# Patient Record
Sex: MALE | Race: WHITE | NOT HISPANIC OR LATINO | Employment: UNEMPLOYED | URBAN - METROPOLITAN AREA
[De-identification: names, ages, dates, MRNs, and addresses within clinical notes are randomized per-mention and may not be internally consistent; named-entity substitution may affect disease eponyms.]

---

## 2023-06-26 ENCOUNTER — TRANSFERRED RECORDS (OUTPATIENT)
Dept: HEALTH INFORMATION MANAGEMENT | Facility: CLINIC | Age: 68
End: 2023-06-26

## 2023-07-12 ENCOUNTER — TRANSFERRED RECORDS (OUTPATIENT)
Dept: HEALTH INFORMATION MANAGEMENT | Facility: CLINIC | Age: 68
End: 2023-07-12
Payer: COMMERCIAL

## 2023-07-12 ENCOUNTER — MEDICAL CORRESPONDENCE (OUTPATIENT)
Dept: HEALTH INFORMATION MANAGEMENT | Facility: CLINIC | Age: 68
End: 2023-07-12
Payer: COMMERCIAL

## 2023-07-12 NOTE — TELEPHONE ENCOUNTER
Records Requested     July 12, 2023 11:40 AM   [id number]   Facility  [facility]   Outcome 11:50am Sent request for imaging & records to be faxed and/or pushed to PACS. -KARENA Nugent on 7/14/2023 at 2:53 PM Records sent to HIM, XR resolve into PACS. -KARENA     Records Requested     July 14, 2023 8:15 AM   [id number]   Facility  [facility]   Outcome 8:18am Sent request for imaging to be pushed to PACS. -KARENA Nugent on 7/17/2023 at 8:12 AM Called [facility] to follow up on imaging request. No answer, left VM that image is needed by this afternoon. -KARENA     RECORDS RECEIVED FROM: Care Everywhere   REASON FOR VISIT: Low back pain    Date of Appt: 7/17/23 2:30pm    NOTES (FOR ALL VISITS) STATUS DETAILS   OFFICE NOTE from referring provider In process 7/12/23 (Transferred Recs)  Herb Nixon MD @ [facility]     DISCHARGE REPORT from the ER In process 6/30/23 Damáin Retana MD @ [facility]     MEDICATION LIST N/A    IMAGING  (FOR ALL VISITS)     X-RAY PACS [facility]  7/12/23  XR Lumbar Spine     MRI (HEAD, NECK, SPINE) In process [facility]  6/26/23  MR Lumbar Spine

## 2023-07-13 ENCOUNTER — TRANSCRIBE ORDERS (OUTPATIENT)
Dept: OTHER | Age: 68
End: 2023-07-13

## 2023-07-13 DIAGNOSIS — G89.29 CHRONIC BILATERAL LOW BACK PAIN WITHOUT SCIATICA: Primary | ICD-10-CM

## 2023-07-13 DIAGNOSIS — M54.50 CHRONIC BILATERAL LOW BACK PAIN WITHOUT SCIATICA: Primary | ICD-10-CM

## 2023-07-14 DIAGNOSIS — M54.50 LOW BACK PAIN, UNSPECIFIED BACK PAIN LATERALITY, UNSPECIFIED CHRONICITY, UNSPECIFIED WHETHER SCIATICA PRESENT: Primary | ICD-10-CM

## 2023-07-15 ENCOUNTER — TELEPHONE (OUTPATIENT)
Dept: NEUROSURGERY | Facility: CLINIC | Age: 68
End: 2023-07-15
Payer: COMMERCIAL

## 2023-07-17 ENCOUNTER — PRE VISIT (OUTPATIENT)
Dept: NEUROSURGERY | Facility: CLINIC | Age: 68
End: 2023-07-17

## 2023-07-17 ENCOUNTER — OFFICE VISIT (OUTPATIENT)
Dept: NEUROSURGERY | Facility: CLINIC | Age: 68
End: 2023-07-17
Payer: COMMERCIAL

## 2023-07-17 ENCOUNTER — ANCILLARY PROCEDURE (OUTPATIENT)
Dept: GENERAL RADIOLOGY | Facility: CLINIC | Age: 68
End: 2023-07-17
Attending: PHYSICIAN ASSISTANT
Payer: COMMERCIAL

## 2023-07-17 VITALS
SYSTOLIC BLOOD PRESSURE: 110 MMHG | OXYGEN SATURATION: 92 % | HEART RATE: 72 BPM | RESPIRATION RATE: 16 BRPM | WEIGHT: 315 LBS | DIASTOLIC BLOOD PRESSURE: 72 MMHG

## 2023-07-17 DIAGNOSIS — M54.16 LUMBAR RADICULOPATHY: ICD-10-CM

## 2023-07-17 DIAGNOSIS — M54.50 LOW BACK PAIN, UNSPECIFIED BACK PAIN LATERALITY, UNSPECIFIED CHRONICITY, UNSPECIFIED WHETHER SCIATICA PRESENT: ICD-10-CM

## 2023-07-17 DIAGNOSIS — M54.50 CHRONIC BILATERAL LOW BACK PAIN WITHOUT SCIATICA: ICD-10-CM

## 2023-07-17 DIAGNOSIS — G89.29 CHRONIC BILATERAL LOW BACK PAIN WITHOUT SCIATICA: ICD-10-CM

## 2023-07-17 DIAGNOSIS — R29.898 LEG WEAKNESS, BILATERAL: ICD-10-CM

## 2023-07-17 DIAGNOSIS — M48.04 THORACIC STENOSIS: Primary | ICD-10-CM

## 2023-07-17 PROCEDURE — 77073 BONE LENGTH STUDIES: CPT | Performed by: STUDENT IN AN ORGANIZED HEALTH CARE EDUCATION/TRAINING PROGRAM

## 2023-07-17 PROCEDURE — 99205 OFFICE O/P NEW HI 60 MIN: CPT | Performed by: PHYSICIAN ASSISTANT

## 2023-07-17 PROCEDURE — 72082 X-RAY EXAM ENTIRE SPI 2/3 VW: CPT | Performed by: STUDENT IN AN ORGANIZED HEALTH CARE EDUCATION/TRAINING PROGRAM

## 2023-07-17 PROCEDURE — 99417 PROLNG OP E/M EACH 15 MIN: CPT | Performed by: PHYSICIAN ASSISTANT

## 2023-07-17 RX ORDER — GLIPIZIDE 10 MG/1
10 TABLET ORAL
COMMUNITY
Start: 2023-05-03

## 2023-07-17 RX ORDER — DAPAGLIFLOZIN 10 MG/1
1 TABLET, FILM COATED ORAL
COMMUNITY
Start: 2022-08-05

## 2023-07-17 RX ORDER — PREGABALIN 75 MG/1
75 CAPSULE ORAL
COMMUNITY
Start: 2023-05-05

## 2023-07-17 RX ORDER — TRAMADOL HYDROCHLORIDE 50 MG/1
50 TABLET ORAL EVERY 8 HOURS PRN
COMMUNITY
Start: 2023-06-30

## 2023-07-17 RX ORDER — TORSEMIDE 20 MG/1
20 TABLET ORAL
COMMUNITY
Start: 2022-05-20

## 2023-07-17 RX ORDER — HYDROCODONE BITARTRATE AND ACETAMINOPHEN 5; 325 MG/1; MG/1
TABLET ORAL
COMMUNITY
Start: 2023-04-27

## 2023-07-17 RX ORDER — DIGOXIN 250 MCG
1 TABLET ORAL DAILY
COMMUNITY
Start: 2022-09-23

## 2023-07-17 RX ORDER — LISINOPRIL 20 MG/1
20 TABLET ORAL
COMMUNITY
Start: 2022-08-31

## 2023-07-17 RX ORDER — COLCHICINE 0.6 MG/1
0.6 TABLET ORAL
COMMUNITY
Start: 2023-04-03

## 2023-07-17 RX ORDER — OXYCODONE AND ACETAMINOPHEN 5; 325 MG/1; MG/1
1-2 TABLET ORAL
COMMUNITY
Start: 2023-06-30

## 2023-07-17 RX ORDER — CELECOXIB 100 MG/1
100 CAPSULE ORAL 2 TIMES DAILY PRN
COMMUNITY
Start: 2023-06-08

## 2023-07-17 RX ORDER — MAGNESIUM OXIDE 400 MG/1
400 TABLET ORAL
COMMUNITY
Start: 2023-04-17

## 2023-07-17 RX ORDER — PREDNISOLONE ACETATE 10 MG/ML
SUSPENSION/ DROPS OPHTHALMIC
COMMUNITY
Start: 2022-12-21

## 2023-07-17 RX ORDER — NEOMYCIN SULFATE, POLYMYXIN B SULFATE, AND DEXAMETHASONE 3.5; 10000; 1 MG/G; [USP'U]/G; MG/G
OINTMENT OPHTHALMIC
COMMUNITY
Start: 2023-05-16

## 2023-07-17 RX ORDER — AZITHROMYCIN 250 MG/1
TABLET, FILM COATED ORAL
COMMUNITY
Start: 2023-07-13

## 2023-07-17 RX ORDER — DOXYCYCLINE 100 MG/1
100 CAPSULE ORAL
COMMUNITY
Start: 2023-06-01

## 2023-07-17 RX ORDER — METOPROLOL TARTRATE 100 MG
100 TABLET ORAL
COMMUNITY
Start: 2022-05-23

## 2023-07-17 ASSESSMENT — PAIN SCALES - GENERAL: PAINLEVEL: WORST PAIN (10)

## 2023-07-17 NOTE — PATIENT INSTRUCTIONS
Recommend weight loss to about 281 lbs which will be < 40 BMI so you can be a surgical candidate if needed.  Consider tirzepatide (Mounjaro) with your PCP or we can set up a consult with our weight management team here.    Thoracic MRI ordered.  This can be done at [facility] in the bariatric machine.  Follow up with me 2-3 days after the injection.  Okay to do this virtually.      Okay to proceed with scheduled L2/3 JUNO on 7/20.  If this is not helpful, we can consider a referral to PM&R to discuss other injections or treatments for your back and radicular pain.     PT order given to patient to go to [place].  You will need to contact PT to get started.

## 2023-07-17 NOTE — PROGRESS NOTES
Neurosurgery Clinic Note    Chief Complaint: low back pain, RLE radiculopathy    History of Present Illness:  It was a pleasure to evaluate Casey Mendez in clinic today at the kind referral of   Damián Retana MD  [facility]  [address] [place] [address].    Casey Mendez is a 67 year old male with a PMH of a-fib (warfarin), DM2 (trulicity, metformin, glargine), BMI 49 who is presenting for evaluation of low back pain and RLE radiculopathy.    Patient describes a sharp shooting pain in lower back that extends down the back of right thigh, calf and bottom of foot for about 3 months.  His pain comes and goes.  He sits on ice packs, uses vibrator for calves and back.  He has great difficulty with walking as he is weak in his legs and feels his toes catch on the floor.  He is able to hobble around short distances, but uses a wheelchair for longer distances.  He was on narcotics and was having issues with constipation, he denies incontinence.  He has bilateral neuropathy that goes up to his knees.  He also notes numbness in his hands.  He has had 2 surgeries in last 2 months, CTS on right and lipoma right shoulder removed. He had spinal surgery when he was 23 y/o for lumbar decompression of nerve on left side through [facility].  He has not had any recent PT, but is open to doing some.  He had a right L5/S1 TFESI through [facility] on 7/6/23 that relieved the radicular pain in his right leg for 1.5 days before it all came back.  This did not help his back pain.  He is schedule to try a L2/3 JUNO on 7/20/23.      He notes weight loss from 464 to 351 lbs.  He continues to work on this with his PCP.      He has difficulty with recalling what medication he has tried in the past and what he is currently taking.  He has tried gabapentin in the past, but said he had to quit taking that because of another medication he was placed on.  I see that he is currently on Lyrica, but he is not familiar with that  medication so I am unsure if he is taking this.         No past medical history on file.    No past surgical history on file.    Social History     Socioeconomic History     Marital status: Single       family history is not on file.       IMAGING   Independently reviewed.       EOS XR 7/17/23 - Trace L4/5 anterolisthesis, DDD L5/S1 most significant. Leg length discrepancy of about 3 cm with right leg being shorter than left leg and about a 12 degree left pelvic obliquity.  Mild dextro thoracolumbar curvature.          Findings:  12 rib bearing vertebral bodies and 5 lumbar type vertebral bodies are  identified.  Coronal Deformity:  There is a mild  convexed right curvature of thoracolumbar/lumbar  spine with apex at L3.   No substantial global coronal imbalance.  Sagittal Vertical Axis (A vertical line drawn from the center of C7  (se line) to the posterosuperior aspect of the S1 on sagittal  plane):  very positive   Weight bearing axis: (Defined as a line drawn from the center of the  femoral head to the mid aspect of the tibial plafond).      Right: Weight bearing axis crosses middle 1/3 of medial tibial  plateau.       Left: Weight bearing axis crosses peripheral 1/3 of medial tibial  plateau.  Leg length:  (Measured from the top of the femoral head to the center  of tibial plafond.  It is assumed joints are in similar degrees of  extension bilaterally.  Significant difference is defined when  discrepancy is greater than 1.5 cm).  Leg length discrepancy measurements are limited given increased right  knee flexion relative to the left. Measuring 87.5 cm on the left and  84.9 cm on the right.  Additional Findings:  Postoperative changes of bilateral total knee arthroplasty. Hardware  appears intact without evidence for loosening.  Borderline cardiomegaly. Soft tissue calcifications about the left  greater than right lateral calf.  No acute osseous abnormality.  There is a nonobstructive bowel gas  pattern.                                                                  Impression:  1. Mild rightward convex curvature of the thoracolumbar/lumbar spine.   2. Very positive global sagittal imbalance.  3. Weight bearing axis as detailed above.  4. Postoperative changes of bilateral total knee arthroplasty without  hardware complication.      MRI lumbar [facility] 23 - CCS from T10-T12 (not viewable on axial imaging), L2/3.  L4/5 joint arthropathy, facet joint effusion and bilateral FS.  Encroachment of NR at every level from L2-S1, specifically on the right at L5/S1 affecting L5 and S1 NR.                    CONCLUSION: Multilevel degenerative disc disease and Schmorl's nodes. Developmental narrowing of the central spinal canal with specific findings according to level includin. Mild cord compression and moderate central spinal canal stenosis at T10-11. Ventral cord flattening and mild central spinal canal stenosis at T11-12. Axial images were not obtained through these levels.  2. Moderate dural sac narrowing at L4-5 with impingement upon the traversing L5 nerves.  3. Moderate dural sac narrowing at L3-4 and L2-3 with encroachment upon the traversing nerves at each of these levels.  4. Subarticular recess stenosis at L5-S1 with encroachment upon the traversing S1 nerves. Moderate right foraminal stenosis with encroachment upon the right L5 nerve.    IR FLUOROGUIDED TRANSFORAMINAL INJ  Result Date: 2023  PROCEDURE: FLUOROSCOPIC GUIDED TRANSFORAMINAL EPIDURAL INJECTION. HISTORY: Low back pain. Right-sided lumbar radiculopathy. L5-S1 injection requested. COMPARISON: MRI from 2023. TECHNIQUE: Risks, benefits and alternatives to the procedure were discussed with the patient and informed consent was obtained. The patient placed in the prone position. The lower back was prepped and draped using sterile technique. A timeout was performed, confirming the patient's name, date of birth, and the  type and the location of the  "procedure. 1% lidocaine was used for local anesthesia. Using intermittent fluoroscopy, a 5 inch 22-gauge spinal needle was advanced into the neural foraminal space at the L5-S1 level on the right utilizing a transforaminal approach. Needle tip position was confirmed after the injection of 1 to 2 mL of Omnipaque 300, which confirmed free flow of contrast into the perineural and epidural space. Subsequently, a combination of 2 mL of dexamethasone (10 mg/mL) and 2 mL of 1% preservative-free Xylocaine were injected. The needle was removed and hemostasis was achieved at the skin puncture site. The patient tolerated the procedure well with no immediate complications or complaints. A total of 48 seconds of fluoroscopy time was utilized for the procedure, and 2 spot images were saved. The patient's preprocedural pain level was 10 out of 10. The patient's postprocedure pain level was 5 out of 10. IMPRESSION: Successful fluoroscopically guided lumbar spine epidural steroid/anesthetic injection performed on the right at the L5-S1 level using a transforaminal approach. Electronically signed by Kaleb Ordoñez MD Report Date: 7/6/2023 10:46 AM    US SOFT TISSUE MISC  Result Date: 4/12/2023  PROCEDURE: US SOFT TISSUE MISC HISTORY: Other specified joint disorders, unspecified shoulder; Please perform US of right shoulder cyst; For:  Cyst of joint of shoulder COMPARISON: None FINDINGS: The palpable abnormality along the posterior aspect of the right shoulder corresponds with a homogeneous solid mass measuring 6.0 x 5.6 x 2.3 cm. This has imaging characteristics most compatible with a lipoma. IMPRESSION: Apparent lipoma corresponding with the palpable lump along the dorsal aspect of the right shoulder. Electronically signed by Emory Pablo MD  Report Date: 4/12/2023 1:22 PM      Nutritional Status:  Height reported by patient as 5'11\"  Weight today 351 pounds.   BMI calculated 49     Physical Exam   /72   Pulse 72   Resp 16   " Wt (!) 159.2 kg (351 lb)   SpO2 92%     Constitutional: Appears well-developed, morbidly obese. Cooperative. No acute distress.   HENT:   Head: Normocephalic and atraumatic.   Eyes: Conjunctivae are normal.  Neck: Neck supple. No tracheal deviation present.  Cardiovascular: Normal rate and regular rhythm.    Pulmonary/Chest: Effort normal and breath sounds normal.  Abdominal: Exhibits no obvious distension.   Musculoskeletal: Able to move all extremities.  No involuntary motor movements. No C/T/L spine tenderness to palpation.    Lumbar flexion/extension range of motion: difficulty with standing upright and limited on ability to F/E--pain noted more with extension vs. flexion.    Skin: Skin is warm, dry and intact.   Psychiatric: Normal mood and affect. Speech is normal and behavior is normal.    Neurological:  Alert, NAD, and oriented to person, place, and time.   No cranial nerve deficit   Gait: wide based, unsteady, hobbling    Strength (L/R)  5/5 Deltoid  5/5 Bicep  5/5 Tricep  5/5 Handgrip    4/4 Hip Flexion  5/5 Knee Extension  4+/4 Ankle Dorsiflexion  3/4 Extensor Hallucis Longus  5/5 Plantar Flexion    Reflexes (L/R)  1+/1+ Bicep  1+/1+ Brachioradialis  0/0 Patellar   0/0 Ankle    No Lhermitte's  No Spurling's  No Jaret's   No ankle clonus    Sensation: notes BLE sensory deficits to about the knee level     ASSESSMENT:  Casey Mendez is a 67 year old male with a PMH of a-fib (warfarin), DM2 (trulicity, metformin, glargine), BMI 49 who is presenting for evaluation of low back pain and RLE radiculopathy.    He has right S1 radicular pain that was relieved for 1.5 days with the right L5/S1 TFESI on 7/6/23.  This did not help his back pain.  His back pain is multifactorial which is likely a combination from his back, excess weight and deconditioning.  It is not likely the L2/3 injection will alleviate his back pain, but it may improve it some for him to participate better with PT.  He has weakness in his  hip flexors and ability to dorsiflex he right greater than left foot and toes.  It is unclear to me at this time if it is a result of the degenerative changes in his back or if he has compression of the spinal cord in the thoracic region as seen on the upper aspect of the lumbar MRI.  Unfortunately the extend of compression is not able to be viewed on axial imaging and he will need to have a thoracic MRI to view this in any meaningful manner.  He is hyporeflexic in his BLE, which I would expect with him having peripheral neuropathy up to his knees.     He is not currently a surgical candidate because of his BMI so will need to continue with conservative treatments at this time.  Surgery would not fix his back pain because this is multifactorial, but would likely alleviate the radicular component of his pain.      PLAN:    The patient's elevated BMI confers significantly elevated surgical and medical risk and the patient has been counseled about weight loss which he would like to do through his PCP if possible.  He will need to have a BMI less than 40, which is a goal of 281 lbs.  I would recommend tirzepatide (Mounjaro) if his insurance will cover it.      PT order provided to patient  - he would like to do this out in [place].  Work on increasing mobility and strengthening of torso and legs.     Thoracic MRI w/o contrast - needs bariatric MRI so can be done a [facility]. Follow up with me 2-3 days after the MRI has been completed to review.  Virtual visit okay.      He has injection scheduled at [place] on 7/20 for L2/3 JUNO.      I spent 116 minutes spent in patient care, independent review and interpretation of medical records/imaging, reviewing old records.       Cotrney Alcantar PA-C  Department of Neurosurgery  Office: [phone]

## 2023-07-17 NOTE — LETTER
7/17/2023       RE: Casey Mendez  [address] [facility] [address]     Dear Colleague,    Thank you for referring your patient, Casey Mendez, to the [facility] [place] at [facility]. Please see a copy of my visit note below.        Neurosurgery Clinic Note    Chief Complaint: low back pain, RLE radiculopathy    History of Present Illness:  It was a pleasure to evaluate Casey Mendez in clinic today at the kind referral of   Damián Retana MD  [facility]  [address] [place] [address].    Csaey Mendez is a 67 year old male with a PMH of a-fib (warfarin), DM2 (trulicity, metformin, glargine), BMI 49 who is presenting for evaluation of low back pain and RLE radiculopathy.    Patient describes a sharp shooting pain in lower back that extends down the back of right thigh, calf and bottom of foot for about 3 months.  His pain comes and goes.  He sits on ice packs, uses vibrator for calves and back.  He has great difficulty with walking as he is weak in his legs and feels his toes catch on the floor.  He is able to hobble around short distances, but uses a wheelchair for longer distances.  He was on narcotics and was having issues with constipation, he denies incontinence.  He has bilateral neuropathy that goes up to his knees.  He also notes numbness in his hands.  He has had 2 surgeries in last 2 months, CTS on right and lipoma right shoulder removed. He had spinal surgery when he was 23 y/o for lumbar decompression of nerve on left side through [facility].  He has not had any recent PT, but is open to doing some.  He had a right L5/S1 TFESI through [facility] on 7/6/23 that relieved the radicular pain in his right leg for 1.5 days before it all came back.  This did not help his back pain.  He is schedule to try a L2/3 JUNO on 7/20/23.      He notes weight loss from 464 to 351 lbs.  He continues to work on this with his  PCP.      He has difficulty with recalling what medication he has tried in the past and what he is currently taking.  He has tried gabapentin in the past, but said he had to quit taking that because of another medication he was placed on.  I see that he is currently on Lyrica, but he is not familiar with that medication so I am unsure if he is taking this.         No past medical history on file.    No past surgical history on file.    Social History     Socioeconomic History    Marital status: Single       family history is not on file.       IMAGING   Independently reviewed.       EOS XR 7/17/23 - Trace L4/5 anterolisthesis, DDD L5/S1 most significant. Leg length discrepancy of about 3 cm with right leg being shorter than left leg and about a 12 degree left pelvic obliquity.  Mild dextro thoracolumbar curvature.          Findings:  12 rib bearing vertebral bodies and 5 lumbar type vertebral bodies are  identified.  Coronal Deformity:  There is a mild  convexed right curvature of thoracolumbar/lumbar  spine with apex at L3.   No substantial global coronal imbalance.  Sagittal Vertical Axis (A vertical line drawn from the center of C7  (se line) to the posterosuperior aspect of the S1 on sagittal  plane):  very positive   Weight bearing axis: (Defined as a line drawn from the center of the  femoral head to the mid aspect of the tibial plafond).      Right: Weight bearing axis crosses middle 1/3 of medial tibial  plateau.       Left: Weight bearing axis crosses peripheral 1/3 of medial tibial  plateau.  Leg length:  (Measured from the top of the femoral head to the center  of tibial plafond.  It is assumed joints are in similar degrees of  extension bilaterally.  Significant difference is defined when  discrepancy is greater than 1.5 cm).  Leg length discrepancy measurements are limited given increased right  knee flexion relative to the left. Measuring 87.5 cm on the left and  84.9 cm on the right.  Additional  Findings:  Postoperative changes of bilateral total knee arthroplasty. Hardware  appears intact without evidence for loosening.  Borderline cardiomegaly. Soft tissue calcifications about the left  greater than right lateral calf.  No acute osseous abnormality.  There is a nonobstructive bowel gas  pattern.                                                                 Impression:  1. Mild rightward convex curvature of the thoracolumbar/lumbar spine.   2. Very positive global sagittal imbalance.  3. Weight bearing axis as detailed above.  4. Postoperative changes of bilateral total knee arthroplasty without  hardware complication.      MRI lumbar Rayus 23 - CCS from T10-T12 (not viewable on axial imaging), L2/3.  L4/5 joint arthropathy, facet joint effusion and bilateral FS.  Encroachment of NR at every level from L2-S1, specifically on the right at L5/S1 affecting L5 and S1 NR.                    CONCLUSION: Multilevel degenerative disc disease and Schmorl's nodes. Developmental narrowing of the central spinal canal with specific findings according to level includin. Mild cord compression and moderate central spinal canal stenosis at T10-11. Ventral cord flattening and mild central spinal canal stenosis at T11-12. Axial images were not obtained through these levels.  2. Moderate dural sac narrowing at L4-5 with impingement upon the traversing L5 nerves.  3. Moderate dural sac narrowing at L3-4 and L2-3 with encroachment upon the traversing nerves at each of these levels.  4. Subarticular recess stenosis at L5-S1 with encroachment upon the traversing S1 nerves. Moderate right foraminal stenosis with encroachment upon the right L5 nerve.    IR FLUOROGUIDED TRANSFORAMINAL INJ  Result Date: 2023  PROCEDURE: FLUOROSCOPIC GUIDED TRANSFORAMINAL EPIDURAL INJECTION. HISTORY: Low back pain. Right-sided lumbar radiculopathy. L5-S1 injection requested. COMPARISON: MRI from 2023. TECHNIQUE: Risks, benefits  and alternatives to the procedure were discussed with the patient and informed consent was obtained. The patient placed in the prone position. The lower back was prepped and draped using sterile technique. A timeout was performed, confirming the patient's name, date of birth, and the  type and the location of the procedure. 1% lidocaine was used for local anesthesia. Using intermittent fluoroscopy, a 5 inch 22-gauge spinal needle was advanced into the neural foraminal space at the L5-S1 level on the right utilizing a transforaminal approach. Needle tip position was confirmed after the injection of 1 to 2 mL of Omnipaque 300, which confirmed free flow of contrast into the perineural and epidural space. Subsequently, a combination of 2 mL of dexamethasone (10 mg/mL) and 2 mL of 1% preservative-free Xylocaine were injected. The needle was removed and hemostasis was achieved at the skin puncture site. The patient tolerated the procedure well with no immediate complications or complaints. A total of 48 seconds of fluoroscopy time was utilized for the procedure, and 2 spot images were saved. The patient's preprocedural pain level was 10 out of 10. The patient's postprocedure pain level was 5 out of 10. IMPRESSION: Successful fluoroscopically guided lumbar spine epidural steroid/anesthetic injection performed on the right at the L5-S1 level using a transforaminal approach. Electronically signed by Kaleb Ordoñez MD Report Date: 7/6/2023 10:46 AM    US SOFT TISSUE MISC  Result Date: 4/12/2023  PROCEDURE: US SOFT TISSUE MISC HISTORY: Other specified joint disorders, unspecified shoulder; Please perform US of right shoulder cyst; For:  Cyst of joint of shoulder COMPARISON: None FINDINGS: The palpable abnormality along the posterior aspect of the right shoulder corresponds with a homogeneous solid mass measuring 6.0 x 5.6 x 2.3 cm. This has imaging characteristics most compatible with a lipoma. IMPRESSION: Apparent lipoma  "corresponding with the palpable lump along the dorsal aspect of the right shoulder. Electronically signed by Emory Pablo MD  Report Date: 4/12/2023 1:22 PM      Nutritional Status:  Height reported by patient as 5'11\"  Weight today 351 pounds.   BMI calculated 49     Physical Exam   /72   Pulse 72   Resp 16   Wt (!) 159.2 kg (351 lb)   SpO2 92%     Constitutional: Appears well-developed, morbidly obese. Cooperative. No acute distress.   HENT:   Head: Normocephalic and atraumatic.   Eyes: Conjunctivae are normal.  Neck: Neck supple. No tracheal deviation present.  Cardiovascular: Normal rate and regular rhythm.    Pulmonary/Chest: Effort normal and breath sounds normal.  Abdominal: Exhibits no obvious distension.   Musculoskeletal: Able to move all extremities.  No involuntary motor movements. No C/T/L spine tenderness to palpation.    Lumbar flexion/extension range of motion: difficulty with standing upright and limited on ability to F/E--pain noted more with extension vs. flexion.    Skin: Skin is warm, dry and intact.   Psychiatric: Normal mood and affect. Speech is normal and behavior is normal.    Neurological:  Alert, NAD, and oriented to person, place, and time.   No cranial nerve deficit   Gait: wide based, unsteady, hobbling    Strength (L/R)  5/5 Deltoid  5/5 Bicep  5/5 Tricep  5/5 Handgrip    4/4 Hip Flexion  5/5 Knee Extension  4+/4 Ankle Dorsiflexion  3/4 Extensor Hallucis Longus  5/5 Plantar Flexion    Reflexes (L/R)  1+/1+ Bicep  1+/1+ Brachioradialis  0/0 Patellar   0/0 Ankle    No Lhermitte's  No Spurling's  No Jaret's   No ankle clonus    Sensation: notes BLE sensory deficits to about the knee level     ASSESSMENT:  Casey Mendez is a 67 year old male with a PMH of a-fib (warfarin), DM2 (trulicity, metformin, glargine), BMI 49 who is presenting for evaluation of low back pain and RLE radiculopathy.    He has right S1 radicular pain that was relieved for 1.5 days with the right L5/S1 " TFESI on 7/6/23.  This did not help his back pain.  His back pain is multifactorial which is likely a combination from his back, excess weight and deconditioning.  It is not likely the L2/3 injection will alleviate his back pain, but it may improve it some for him to participate better with PT.  He has weakness in his hip flexors and ability to dorsiflex he right greater than left foot and toes.  It is unclear to me at this time if it is a result of the degenerative changes in his back or if he has compression of the spinal cord in the thoracic region as seen on the upper aspect of the lumbar MRI.  Unfortunately the extend of compression is not able to be viewed on axial imaging and he will need to have a thoracic MRI to view this in any meaningful manner.  He is hyporeflexic in his BLE, which I would expect with him having peripheral neuropathy up to his knees.     He is not currently a surgical candidate because of his BMI so will need to continue with conservative treatments at this time.  Surgery would not fix his back pain because this is multifactorial, but would likely alleviate the radicular component of his pain.      PLAN:    The patient's elevated BMI confers significantly elevated surgical and medical risk and the patient has been counseled about weight loss which he would like to do through his PCP if possible.  He will need to have a BMI less than 40, which is a goal of 281 lbs.  I would recommend tirzepatide (Mounjaro) if his insurance will cover it.      PT order provided to patient  - he would like to do this out in [place].  Work on increasing mobility and strengthening of torso and legs.     Thoracic MRI w/o contrast - needs bariatric MRI so can be done a Rayus. Follow up with me 2-3 days after the MRI has been completed to review.  Virtual visit okay.      He has injection scheduled at [place] on 7/20 for L2/3 JUNO.      I spent 116 minutes spent in patient care, independent review and  interpretation of medical records/imaging, reviewing old records.         Again, thank you for allowing me to participate in the care of your patient.      Sincerely,    Cortney Alcantar PA-C